# Patient Record
Sex: MALE | NOT HISPANIC OR LATINO
[De-identification: names, ages, dates, MRNs, and addresses within clinical notes are randomized per-mention and may not be internally consistent; named-entity substitution may affect disease eponyms.]

---

## 2019-01-18 ENCOUNTER — TRANSCRIPTION ENCOUNTER (OUTPATIENT)
Age: 74
End: 2019-01-18

## 2019-01-18 ENCOUNTER — INPATIENT (INPATIENT)
Facility: HOSPITAL | Age: 74
LOS: 0 days | Discharge: HOME | DRG: 287 | End: 2019-01-18
Attending: INTERNAL MEDICINE | Admitting: INTERNAL MEDICINE
Payer: MEDICAID

## 2019-01-18 VITALS
WEIGHT: 149.91 LBS | DIASTOLIC BLOOD PRESSURE: 75 MMHG | SYSTOLIC BLOOD PRESSURE: 129 MMHG | HEIGHT: 67 IN | OXYGEN SATURATION: 99 % | RESPIRATION RATE: 18 BRPM | HEART RATE: 76 BPM

## 2019-01-18 PROCEDURE — 93458 L HRT ARTERY/VENTRICLE ANGIO: CPT | Mod: 26

## 2019-01-18 PROCEDURE — 93571 IV DOP VEL&/PRESS C FLO 1ST: CPT | Mod: 26

## 2019-01-18 RX ORDER — SODIUM CHLORIDE 9 MG/ML
500 INJECTION INTRAMUSCULAR; INTRAVENOUS; SUBCUTANEOUS
Qty: 0 | Refills: 0 | Status: DISCONTINUED | OUTPATIENT
Start: 2019-01-18 | End: 2019-01-18

## 2019-01-18 RX ORDER — METOPROLOL TARTRATE 50 MG
1 TABLET ORAL
Qty: 0 | Refills: 0 | COMMUNITY

## 2019-01-18 RX ORDER — NITROGLYCERIN 6.5 MG
1 CAPSULE, EXTENDED RELEASE ORAL
Qty: 30 | Refills: 6 | OUTPATIENT
Start: 2019-01-18 | End: 2019-08-15

## 2019-01-18 RX ORDER — LOSARTAN/HYDROCHLOROTHIAZIDE 100MG-25MG
1 TABLET ORAL
Qty: 0 | Refills: 0 | COMMUNITY

## 2019-01-18 RX ORDER — CHLORHEXIDINE GLUCONATE 213 G/1000ML
1 SOLUTION TOPICAL ONCE
Qty: 0 | Refills: 0 | Status: DISCONTINUED | OUTPATIENT
Start: 2019-01-18 | End: 2019-01-18

## 2019-01-18 RX ORDER — CALCIUM CARBONATE 500(1250)
2 TABLET ORAL
Qty: 0 | Refills: 0 | COMMUNITY

## 2019-01-18 RX ORDER — AMLODIPINE BESYLATE 2.5 MG/1
2.5 TABLET ORAL DAILY
Qty: 0 | Refills: 0 | Status: DISCONTINUED | OUTPATIENT
Start: 2019-01-18 | End: 2019-01-18

## 2019-01-18 RX ORDER — ROSUVASTATIN CALCIUM 5 MG/1
1 TABLET ORAL
Qty: 30 | Refills: 6 | OUTPATIENT
Start: 2019-01-18 | End: 2019-08-15

## 2019-01-18 RX ORDER — ERGOCALCIFEROL 1.25 MG/1
1 CAPSULE ORAL
Qty: 0 | Refills: 0 | COMMUNITY

## 2019-01-18 RX ORDER — AMLODIPINE BESYLATE 2.5 MG/1
1 TABLET ORAL
Qty: 30 | Refills: 6 | OUTPATIENT
Start: 2019-01-18 | End: 2019-08-15

## 2019-01-18 RX ORDER — ASPIRIN/CALCIUM CARB/MAGNESIUM 324 MG
1 TABLET ORAL
Qty: 30 | Refills: 11 | OUTPATIENT
Start: 2019-01-18 | End: 2020-01-12

## 2019-01-18 RX ORDER — ALBUTEROL 90 UG/1
2 AEROSOL, METERED ORAL EVERY 4 HOURS
Qty: 0 | Refills: 0 | Status: DISCONTINUED | OUTPATIENT
Start: 2019-01-18 | End: 2019-01-18

## 2019-01-18 RX ORDER — BUDESONIDE AND FORMOTEROL FUMARATE DIHYDRATE 160; 4.5 UG/1; UG/1
2 AEROSOL RESPIRATORY (INHALATION)
Qty: 0 | Refills: 0 | COMMUNITY

## 2019-01-18 RX ORDER — BUDESONIDE AND FORMOTEROL FUMARATE DIHYDRATE 160; 4.5 UG/1; UG/1
2 AEROSOL RESPIRATORY (INHALATION)
Qty: 0 | Refills: 0 | Status: DISCONTINUED | OUTPATIENT
Start: 2019-01-18 | End: 2019-01-18

## 2019-01-18 RX ORDER — ALBUTEROL 90 UG/1
2 AEROSOL, METERED ORAL
Qty: 0 | Refills: 0 | COMMUNITY

## 2019-01-18 RX ORDER — METOPROLOL TARTRATE 50 MG
25 TABLET ORAL
Qty: 0 | Refills: 0 | Status: DISCONTINUED | OUTPATIENT
Start: 2019-01-18 | End: 2019-01-18

## 2019-01-18 RX ORDER — PANTOPRAZOLE SODIUM 20 MG/1
1 TABLET, DELAYED RELEASE ORAL
Qty: 0 | Refills: 0 | COMMUNITY

## 2019-01-18 RX ORDER — CLOPIDOGREL BISULFATE 75 MG/1
300 TABLET, FILM COATED ORAL ONCE
Qty: 0 | Refills: 0 | Status: COMPLETED | OUTPATIENT
Start: 2019-01-18 | End: 2019-01-18

## 2019-01-18 RX ORDER — AMLODIPINE BESYLATE 2.5 MG/1
1 TABLET ORAL
Qty: 0 | Refills: 0 | COMMUNITY

## 2019-01-18 RX ORDER — PANTOPRAZOLE SODIUM 20 MG/1
40 TABLET, DELAYED RELEASE ORAL
Qty: 0 | Refills: 0 | Status: DISCONTINUED | OUTPATIENT
Start: 2019-01-18 | End: 2019-01-18

## 2019-01-18 RX ADMIN — CLOPIDOGREL BISULFATE 300 MILLIGRAM(S): 75 TABLET, FILM COATED ORAL at 15:45

## 2019-01-18 NOTE — DISCHARGE NOTE ADULT - PATIENT PORTAL LINK FT
You can access the [facility] Patient Portal, offered by [facility], by registering with the following website: [email or web address]

## 2019-01-18 NOTE — DISCHARGE NOTE ADULT - HOSPITAL COURSE
73 y/o M former smoker, with PMHx of HTN, Asthma, Chronic Interstitial Lung Disease, who presented to [facility] on 1/16/19 and was seen by Dr Zavala c/o new onset left sided chest pain radiating to left arm, occurring intermittently for the past 3 days. He describes the pain as a pressure worsened with exertion and improved with rest. He additionally endorses chronic SOB which he attributes to his chronic lung disease. He denies HA, dizziness, syncope, palpitations, orthopnea, PND, LE edema, n/v/d, dysuria, melena, BRBPR, any other symptoms or recent travel. Troponin I <0.012, <0.012, 0.010. EKG on 1/16/18? (per MD note) showed NSR with TWI in inferior leads. EKG on 1/17/18? (per MD note) showed NSR. Pt underwent CCTA on 1/16/19 revealing Calcium Score 591 with severe atherosclerotic disease of LAD, mildly obstructive atherosclerotic disease of LM, LCx, and RCA. Non-cardiac findings of CCTA revealed extensive honeycombing in the RLL and RML most c/w end-stage lung disease, mild irregular interlobular septal thickening in the visualized left lung and RUL, apparent esophageal wall thickening (accurate evaluation precluded by lack of contrast and attention, recommended correlation for possible esophagitis with upper endoscopy to be ordered as clinically indicated). In light of pt’s unstable angina, risk factors, and abnormal CCTA, pt is now transferred to [facility] for recommended left heart cath with possible intervention if clinically indicated.  Pt is now s/p diagnostic cardiac cath on 1/18/19: ostial LM 20%, pLAD 30%, mLAD 30% IFR of the mid LAD was performed and noted to be 0.91. We then performed FFR. The level was noted to 0.8 at etienne. HOWEVER, there was drift noted as the Verata wire was pulled back to the level of the LMCA with a reading of 0.95, D1 small, minor plaque, pLCx 40%, pRCA 30%, mRCA 30%, dRCA 30%, LV normal size, EF 65%. Pt was recommended to optimize medical management and f/u with Dr. Titus within 1-2 weeks. Pt's Amlodipine was increased from 2.5mg to 10mg QD, Losartan/HCTZ was d/c, started ASA 81mg QD, Crestor 40mg QD, and SLG NTG as per Dr. Titus.   Patient has been medically cleared for discharge as per Dr. Titus. Patient has been given appropriate discharge instructions including medication regimen, access site management and follow up. Medications that patient needs refills on have been e-prescribed to preferred pharmacy. 75 y/o M former smoker, with PMHx of HTN, Asthma, Chronic Interstitial Lung Disease, who [place] [facility] [place] and was seen by Dr Zavala c/o new onset left sided chest pain radiating to left arm, occurring intermittently for the past 3 days. He describes the pain as a pressure worsened with exertion and improved with rest. He additionally endorses chronic SOB which he attributes to his chronic lung disease. He denies HA, dizziness, syncope, palpitations, orthopnea, PND, LE edema, n/v/d, dysuria, melena, BRBPR, any other symptoms or recent travel. Troponin I <0.012, <0.012, 0.010. EKG on 1/16/18? (per MD note) showed NSR with TWI in inferior leads. EKG on 1/17/18? (per MD note) showed NSR. Pt underwent CCTA on 1/16/19 revealing Calcium Score 591 with severe atherosclerotic disease of LAD, mildly obstructive atherosclerotic disease of LM, LCx, and RCA. Non-cardiac findings of CCTA revealed extensive honeycombing in the RLL and RML most c/w end-stage lung disease, mild irregular interlobular septal thickening in the visualized left lung and RUL, apparent esophageal wall thickening (accurate evaluation precluded by lack of contrast and attention, recommended correlation for possible esophagitis with upper endoscopy to be ordered as clinically indicated). In light of pt’s unstable angina, risk factors, and abnormal CCTA, pt is now transferred to [facility] for recommended left heart cath with possible intervention if clinically indicated.  Pt is now s/p diagnostic cardiac cath on 1/18/19: ostial LM 20%, pLAD 30%, mLAD 30% IFR of the mid LAD was performed and noted to be 0.91. We then performed FFR. The level was noted to 0.8 at etienne. HOWEVER, there was drift noted as the Verata wire was pulled back to the level of the LMCA with a reading of 0.95, D1 small, minor plaque, pLCx 40%, pRCA 30%, mRCA 30%, dRCA 30%, LV normal size, EF 65%, R Groin AS, access site stable without bleeding or hematoma noted. Pt was recommended to optimize medical management and f/u with Dr. Titus within 1-2 weeks. Pt's Amlodipine was increased from 2.5mg to 10mg QD, Losartan/HCTZ was d/c, started ASA 81mg QD, Crestor 40mg QD, and SLG NTG as per Dr. Titus.   Patient has been medically cleared for discharge as per Dr. Titus. Patient has been given appropriate discharge instructions including medication regimen, access site management and follow up. Medications that patient needs refills on have been e-prescribed to preferred pharmacy.

## 2019-01-18 NOTE — DISCHARGE NOTE ADULT - CARE PLAN
Principal Discharge DX:	Chest pain  Goal:	Please make sure to schedule an appointment to see Dr. Titus within 1-2 weeks of discharge.  Assessment and plan of treatment:	You were transferred to [facility] because of an abnormal CT scan of your chest that was done because of your chest pain. You underwent a cardiac angiogram that did not show any sgnificant blockages requiring stents.  Avoid strenuous activity or heavy lifting for the next five days. Do not take a bath or swim for the next five days; you may shower. For any bleeding or hematoma formation (hardened blood collection under the skin) at the access site of your right groin, please hold pressure and go to the emergency room. Please follow up with Dr. Titus in 1-2 weeks. For recurrent chest pain, please call your doctor or go to the emergency room.  We have sent a prescription for Nitroglycerin sublingual tablets which you should only take when experiencing chest pain which you should take every 5 minutes as needed when still experiencing the chest pain and for no more than 3 total doses. FOR ANY CHEST PAIN THAT PERSISTS DESPITE THIS MEDICATION, YOU SHOULD GO IMMEDIATELY TO THE EMERGENCY ROOM! Please take Aspirin 81mg daily to help protect your heart.  Secondary Diagnosis:	HTN (hypertension)  Assessment and plan of treatment:	We have STOPPED YOUR LOSARTAN/HYDROCHLOROTHIAZIDE AND INCREASED YOUR AMLODIPINE FROM 2.5MG DAILY TO 10MG DAILY. PLEASE CONTINUE TAKING YOUR METOPROLOL 25MG TWICE DAILY AS PRESCRIBED.  Secondary Diagnosis:	Asthma  Assessment and plan of treatment:	Please continue taking your medications as prescribed and follow up with your pulmonologist (lung doctor).  Secondary Diagnosis:	HLD (hyperlipidemia)  Assessment and plan of treatment:	We have started you on a medication called Crestor 40mg once daily to help lower your cholesterol and reduce your risk of heart disease progression. Please see Dr. Titus and your primary care doctor within 2 weeks to check your cholesterol and liver enzymes while on this medication.

## 2019-01-18 NOTE — DISCHARGE NOTE ADULT - MEDICATION SUMMARY - MEDICATIONS TO TAKE
I will START or STAY ON the medications listed below when I get home from the hospital:    Ecotrin Adult Low Strength 81 mg oral delayed release tablet  -- 1 tab(s) by mouth once a day   -- Swallow whole.  Do not crush.  Take with food or milk.    -- Indication: For Heart Protection    calcium carbonate 600 mg oral tablet, chewable  -- 2 tab(s) by mouth once a day  -- Indication: For Acid Reflux    nitroglycerin 0.4 mg sublingual tablet  -- 1 tab(s) sublingually every 5 minutes for no more than 3 doses for chest pain. PLEASE GO TO EMERGENCY ROOM FOR CHEST PAIN THAT PERSISTS  -- It is very important that you take or use this exactly as directed.  Do not skip doses or discontinue unless directed by your doctor.    -- Indication: For Chest Pain    Crestor 40 mg oral tablet  -- 1 tab(s) by mouth once a day (at bedtime)   -- Do not take dairy products, antacids, or iron preparations within one hour of this medication.  Do not take this drug if you are pregnant.  It is very important that you take or use this exactly as directed.  Do not skip doses or discontinue unless directed by your doctor.    -- Indication: For Cholesterol    Metoprolol Tartrate 25 mg oral tablet  -- 1 tab(s) by mouth 2 times a day  -- Indication: For Blood pressure    Symbicort 160 mcg-4.5 mcg/inh inhalation aerosol  -- 2 puff(s) inhaled 2 times a day  -- Indication: For Breathing    Ventolin HFA 90 mcg/inh inhalation aerosol  -- 2 puff(s) inhaled every 4 hours  -- Indication: For Breathing    amLODIPine 10 mg oral tablet  -- 1 tab(s) by mouth once a day  -- Indication: For Blood Pressure    pantoprazole 40 mg oral delayed release tablet  -- 1 tab(s) by mouth once a day  -- Indication: For Stomach protection    Multiple Vitamins oral tablet  -- 1 tab(s) by mouth once a day  -- Indication: For Vitamin    Vitamin D2 50,000 intl units (1.25 mg) oral capsule  -- 1 cap(s) by mouth once a week  -- Indication: For vitamin

## 2019-01-18 NOTE — H&P ADULT - HISTORY OF PRESENT ILLNESS
75 y/o M former smoker, with PMHx of HTN, Asthma, Chronic Interstitial Lung Disease, who [place] [facility] [place] on 1[place]16/19 and was seen by Dr Zavala c/o new onset left sided chest pain radiating to left arm, occurring intermittently for the past 1 week. He describes the pain as a pressure worsened with exertion and improved with rest. He additionally endorses productive cough with yellow sputum and associated SOB with fever for the past one week. He denies HA, dizziness, syncope, palpitations, orthopnea, PND, LE edema, n/v/d, dysuria, melena, BRBPR, any other symptoms or recent travel. Troponin I <0.012, <0.012, 0.010. EKG on 1/16/18? (per MD note) showed NSR with TWI in inferior leads. EKG on 1/17/18? (per MD note) showed NSR. Pt underwent CCTA on 1/16/19 revealing Calcium Score 591 with severe atherosclerotic disease of LAD, mildly obstructive atherosclerotic disease of LM, LCx, and RCA. Non-cardiac findings of CCTA revealed extensive honeycombing in the RLL and RML most c/w end-stage lung disease, mild irregular interlobular septal thickening in the visualized left lung and RUL, apparent esophageal wall thickening (accurate evaluation precluded by lack of contrast and attention, recommended correlation for possible esophagitis with upper endoscopy to be ordered as clinically indicated). In light of pt’s unstable angina, risk factors, and abnormal CCTA, pt is now transferred to [facility] for recommended left heart cath with possible intervention if clinically indicated.  Of note, lab work from this AM at [facility] significant for H/H 12.7/39.2, PPTT 108 (pt on Heparin gtt), K 4.1, BUN/CR 26/1.2. Per signout given, pt has received Plavix 75mg PO x 6 since admission totaling 450mg.   Meds: Amlodipine 2.5mg PO QD, Losartan/HCTZ 50mg/12.5mg QD, Metoprolol 25mg BID 75 y/o M former smoker, with PMHx of HTN, Asthma, Chronic Interstitial Lung Disease, who presented to [facility] ED on 1/16/19 and was seen by Dr Zavala c/o new onset left sided chest pain radiating to left arm, occurring intermittently for the past 3 days. He describes the pain as a pressure worsened with exertion and improved with rest. He additionally endorses chronic SOB which he attributes to his chronic lung disease. He denies HA, dizziness, syncope, palpitations, orthopnea, PND, LE edema, n/v/d, dysuria, melena, BRBPR, any other symptoms or recent travel. Troponin I <0.012, <0.012, 0.010. EKG on 1/16/18? (per MD note) showed NSR with TWI in inferior leads. EKG on 1/17/18? (per MD note) showed NSR. Pt underwent CCTA on 1/16/19 revealing Calcium Score 591 with severe atherosclerotic disease of LAD, mildly obstructive atherosclerotic disease of LM, LCx, and RCA. Non-cardiac findings of CCTA revealed extensive honeycombing in the RLL and RML most c/w end-stage lung disease, mild irregular interlobular septal thickening in the visualized left lung and RUL, apparent esophageal wall thickening (accurate evaluation precluded by lack of contrast and attention, recommended correlation for possible esophagitis with upper endoscopy to be ordered as clinically indicated). In light of pt’s unstable angina, risk factors, and abnormal CCTA, pt is now transferred to [facility] for recommended left heart cath with possible intervention if clinically indicated.  Of note, lab work from this AM at [facility] significant for H/H 12.7/39.2,  (pt on Heparin gtt at 6.8mL/hr), K 4.1, BUN/CR 26/1.2, D-Dimer 492. Per signout given, pt has received Plavix 75mg PO x 6 since admission totaling 450mg.

## 2019-01-18 NOTE — H&P ADULT - BACK
How Severe Are Your Warts?: moderate Is This A New Presentation, Or A Follow-Up?: Warts No deformity or limitation of movement

## 2019-01-18 NOTE — DISCHARGE NOTE ADULT - PROVIDER TOKENS
FREE:[LAST:[[employer]acho],FIRST:[[employer]],PHONE:[phone]],FAX:[phone]    -],ADDRESS:[[address] [place] [address]]]

## 2019-01-18 NOTE — DISCHARGE NOTE ADULT - PLAN OF CARE
Please make sure to schedule an appointment to see Dr. Titus within 1-2 weeks of discharge. You were transferred to [facility] because of an abnormal CT scan of your chest that was done because of your chest pain. You underwent a cardiac angiogram that did not show any sgnificant blockages requiring stents.  Avoid strenuous activity or heavy lifting for the next five days. Do not take a bath or swim for the next five days; you may shower. For any bleeding or hematoma formation (hardened blood collection under the skin) at the access site of your right groin, please hold pressure and go to the emergency room. Please follow up with Dr. Titus in 1-2 weeks. For recurrent chest pain, please call your doctor or go to the emergency room.  We have sent a prescription for Nitroglycerin sublingual tablets which you should only take when experiencing chest pain which you should take every 5 minutes as needed when still experiencing the chest pain and for no more than 3 total doses. FOR ANY CHEST PAIN THAT PERSISTS DESPITE THIS MEDICATION, YOU SHOULD GO IMMEDIATELY TO THE EMERGENCY ROOM! Please take Aspirin 81mg daily to help protect your heart. We have STOPPED YOUR LOSARTAN/HYDROCHLOROTHIAZIDE AND INCREASED YOUR AMLODIPINE FROM 2.5MG DAILY TO 10MG DAILY. PLEASE CONTINUE TAKING YOUR METOPROLOL 25MG TWICE DAILY AS PRESCRIBED. Please continue taking your medications as prescribed and follow up with your pulmonologist (lung doctor). We have started you on a medication called Crestor 40mg once daily to help lower your cholesterol and reduce your risk of heart disease progression. Please see Dr. Titus and your primary care doctor within 2 weeks to check your cholesterol and liver enzymes while on this medication.

## 2019-01-25 DIAGNOSIS — R07.9 CHEST PAIN, UNSPECIFIED: ICD-10-CM

## 2019-01-25 DIAGNOSIS — J84.9 INTERSTITIAL PULMONARY DISEASE, UNSPECIFIED: ICD-10-CM

## 2019-01-25 DIAGNOSIS — E78.5 HYPERLIPIDEMIA, UNSPECIFIED: ICD-10-CM

## 2019-01-25 DIAGNOSIS — J45.909 UNSPECIFIED ASTHMA, UNCOMPLICATED: ICD-10-CM

## 2019-01-25 DIAGNOSIS — I10 ESSENTIAL (PRIMARY) HYPERTENSION: ICD-10-CM

## 2019-01-25 DIAGNOSIS — Z87.891 PERSONAL HISTORY OF NICOTINE DEPENDENCE: ICD-10-CM

## 2019-02-04 PROCEDURE — C1760: CPT

## 2019-02-04 PROCEDURE — C1889: CPT

## 2019-02-04 PROCEDURE — C1769: CPT

## 2019-02-04 PROCEDURE — C1894: CPT

## 2019-02-04 PROCEDURE — C1887: CPT

## 2019-05-08 PROBLEM — Z00.00 ENCOUNTER FOR PREVENTIVE HEALTH EXAMINATION: Status: ACTIVE | Noted: 2019-05-08

## 2019-05-08 PROBLEM — J45.909 UNSPECIFIED ASTHMA, UNCOMPLICATED: Chronic | Status: ACTIVE | Noted: 2019-01-18

## 2019-05-08 PROBLEM — I10 ESSENTIAL (PRIMARY) HYPERTENSION: Chronic | Status: ACTIVE | Noted: 2019-01-18

## 2019-05-08 PROBLEM — J98.4 OTHER DISORDERS OF LUNG: Chronic | Status: ACTIVE | Noted: 2019-01-18

## 2019-05-22 ENCOUNTER — APPOINTMENT (OUTPATIENT)
Dept: RADIOLOGY | Facility: HOSPITAL | Age: 74
End: 2019-05-22
